# Patient Record
Sex: FEMALE | Race: WHITE | Employment: FULL TIME | URBAN - METROPOLITAN AREA
[De-identification: names, ages, dates, MRNs, and addresses within clinical notes are randomized per-mention and may not be internally consistent; named-entity substitution may affect disease eponyms.]

---

## 2023-08-24 ENCOUNTER — OFFICE VISIT (OUTPATIENT)
Dept: VASCULAR SURGERY | Age: 63
End: 2023-08-24
Payer: COMMERCIAL

## 2023-08-24 VITALS — BODY MASS INDEX: 23.92 KG/M2 | WEIGHT: 135 LBS | HEIGHT: 63 IN

## 2023-08-24 DIAGNOSIS — I83.893 SYMPTOMATIC VARICOSE VEINS OF BOTH LOWER EXTREMITIES: Primary | ICD-10-CM

## 2023-08-24 PROCEDURE — 99204 OFFICE O/P NEW MOD 45 MIN: CPT | Performed by: SURGERY

## 2023-08-24 RX ORDER — PAROXETINE HYDROCHLORIDE 20 MG/1
20 TABLET, FILM COATED ORAL DAILY
COMMUNITY
Start: 2023-02-22

## 2023-08-24 ASSESSMENT — ENCOUNTER SYMPTOMS
ALLERGIC/IMMUNOLOGIC NEGATIVE: 1
GASTROINTESTINAL NEGATIVE: 1
RESPIRATORY NEGATIVE: 1
EYES NEGATIVE: 1

## 2023-08-24 NOTE — PROGRESS NOTES
Thigh/calf 5-7 mm       Assessment:      Symptomatic varicose veins B legs      Plan:      Begin TH or PH 20/30 mmHg compression stockings daily especially at work ([employer] ). F/U with MD after B venous reflux scan to discuss test results and treatment options. Explained pathophysiology of venous disease. All questions answered and both pt and  seem to undertand. Agree to this approach.

## 2023-10-04 DIAGNOSIS — I83.893 SYMPTOMATIC VARICOSE VEINS OF BOTH LOWER EXTREMITIES: Primary | ICD-10-CM

## 2023-10-05 ENCOUNTER — OFFICE VISIT (OUTPATIENT)
Dept: VASCULAR SURGERY | Age: 63
End: 2023-10-05
Payer: COMMERCIAL

## 2023-10-05 ENCOUNTER — PROCEDURE VISIT (OUTPATIENT)
Dept: VASCULAR SURGERY | Age: 63
End: 2023-10-05
Payer: COMMERCIAL

## 2023-10-05 VITALS — HEIGHT: 63 IN | HEART RATE: 64 BPM | BODY MASS INDEX: 23.92 KG/M2 | WEIGHT: 135 LBS

## 2023-10-05 DIAGNOSIS — I83.893 SYMPTOMATIC VARICOSE VEINS OF BOTH LOWER EXTREMITIES: ICD-10-CM

## 2023-10-05 DIAGNOSIS — I83.893 SYMPTOMATIC VARICOSE VEINS OF BOTH LOWER EXTREMITIES: Primary | ICD-10-CM

## 2023-10-05 PROCEDURE — 99213 OFFICE O/P EST LOW 20 MIN: CPT | Performed by: SURGERY

## 2023-10-05 PROCEDURE — 93970 EXTREMITY STUDY: CPT | Performed by: SURGERY

## 2023-10-05 NOTE — PROGRESS NOTES
Seen back for symptomatic varicose veins B legs. Pt has regularly been wearing the prescribed TH 20/30 mmHg stockings with some benefit as decreased discomfort. No reported edema, bleeding, tender cord, skin changes or ulceration. Recent venous reflux scan performed on 10/5/2023 at [facility]. EXAM:  No edema, ulceration or dermatitis. All VVs soft, nontender without erythema. VRS - no deep reflux; B GSV reflux, R Giacomini reflux, L LSV reflux    A/P: Chronic superficial venous insufficiency B legs with secondary symptomatic varicose veins   SIGNIFICANT B AXIAL REFLUX with LARGE VARICOSITIES. Surgery is recommended - B GSV RFA + R GIACOMINI RFA + L LSVRFA + B stab phlebectomies. This was discussed in terms that the patient could understand. The risks, including bleeding, clotting, bruising, swelling, neuritis, skin dimpling, infection, pigmentation, scarring, and mortality were discussed. I answered all questions pertaining to surgery and post operative expectations related to return to work and daily activity. Time spent counseling and coordination of care: 25 minutes. More than 50% of visit was spent reviewing and discussing venous duplex scan. She will continue compression stockings and schedule as recommended if desired.

## 2023-12-04 ENCOUNTER — TELEPHONE (OUTPATIENT)
Dept: VASCULAR SURGERY | Age: 63
End: 2023-12-04

## 2023-12-04 NOTE — TELEPHONE ENCOUNTER
Pt called regarding insurance approval for surgery with Dr. Botello. Pt was seen in October at [facility] in [place]. Pt has not heard anything about approval for surgery and was checking on where she was in this process. Best callback number is [phone].        Please advise.